# Patient Record
Sex: MALE | Race: BLACK OR AFRICAN AMERICAN | NOT HISPANIC OR LATINO | Employment: FULL TIME | URBAN - METROPOLITAN AREA
[De-identification: names, ages, dates, MRNs, and addresses within clinical notes are randomized per-mention and may not be internally consistent; named-entity substitution may affect disease eponyms.]

---

## 2020-11-19 ENCOUNTER — HOSPITAL ENCOUNTER (OUTPATIENT)
Dept: URGENT CARE | Facility: CLINIC | Age: 30
Discharge: HOME | End: 2020-11-19

## 2020-11-21 LAB — BACTERIA SPEC AEROBE CULT: NORMAL

## 2020-11-22 LAB — SARS-COV-2 RNA SPEC QL NAA+PROBE: NOT DETECTED

## 2021-08-09 PROCEDURE — 87081 CULTURE SCREEN ONLY: CPT | Performed by: NURSE PRACTITIONER

## 2021-08-09 PROCEDURE — U0003 INFECTIOUS AGENT DETECTION BY NUCLEIC ACID (DNA OR RNA); SEVERE ACUTE RESPIRATORY SYNDROME CORONAVIRUS 2 (SARS-COV-2) (CORONAVIRUS DISEASE [COVID-19]), AMPLIFIED PROBE TECHNIQUE, MAKING USE OF HIGH THROUGHPUT TECHNOLOGIES AS DESCRIBED BY CMS-2020-01-R: HCPCS | Performed by: NURSE PRACTITIONER

## 2021-08-10 ENCOUNTER — TELEPHONE (OUTPATIENT)
Dept: URGENT CARE | Facility: CLINIC | Age: 31
End: 2021-08-10

## 2021-08-10 NOTE — TELEPHONE ENCOUNTER
----- Message from RICHARD Resendiz sent at 8/9/2021  8:52 PM EDT -----  Please notify patient of negative Covid result.

## 2021-08-11 ENCOUNTER — TELEPHONE (OUTPATIENT)
Dept: OTHER | Facility: OTHER | Age: 31
End: 2021-08-11

## 2021-08-12 ENCOUNTER — TELEPHONE (OUTPATIENT)
Dept: OTHER | Facility: OTHER | Age: 31
End: 2021-08-12

## 2021-08-12 NOTE — TELEPHONE ENCOUNTER
----- Message from RICHRAD Tejada sent at 8/11/2021 10:08 AM EDT -----  Please call the patient regarding his negative result.

## 2021-08-13 NOTE — TELEPHONE ENCOUNTER
----- Message from RICHARD Tejada sent at 8/11/2021 10:08 AM EDT -----  Please call the patient regarding his negative result.

## 2021-08-30 PROCEDURE — U0003 INFECTIOUS AGENT DETECTION BY NUCLEIC ACID (DNA OR RNA); SEVERE ACUTE RESPIRATORY SYNDROME CORONAVIRUS 2 (SARS-COV-2) (CORONAVIRUS DISEASE [COVID-19]), AMPLIFIED PROBE TECHNIQUE, MAKING USE OF HIGH THROUGHPUT TECHNOLOGIES AS DESCRIBED BY CMS-2020-01-R: HCPCS | Performed by: NURSE PRACTITIONER

## 2022-12-16 PROCEDURE — 87899 AGENT NOS ASSAY W/OPTIC: CPT | Performed by: FAMILY MEDICINE

## 2022-12-16 PROCEDURE — 80074 ACUTE HEPATITIS PANEL: CPT | Performed by: FAMILY MEDICINE

## 2022-12-16 PROCEDURE — 86665 EPSTEIN-BARR CAPSID VCA: CPT | Performed by: FAMILY MEDICINE

## 2022-12-16 PROCEDURE — 86701 HIV-1ANTIBODY: CPT | Performed by: FAMILY MEDICINE

## 2022-12-16 PROCEDURE — 87110 CHLAMYDIA CULTURE: CPT | Performed by: FAMILY MEDICINE

## 2022-12-16 PROCEDURE — 86592 SYPHILIS TEST NON-TREP QUAL: CPT | Performed by: FAMILY MEDICINE

## 2022-12-16 PROCEDURE — 86702 HIV-2 ANTIBODY: CPT | Performed by: FAMILY MEDICINE

## 2022-12-16 PROCEDURE — 87140 CULTURE TYPE IMMUNOFLUORESC: CPT | Performed by: FAMILY MEDICINE

## 2022-12-16 PROCEDURE — 87081 CULTURE SCREEN ONLY: CPT | Performed by: FAMILY MEDICINE

## 2022-12-16 PROCEDURE — G0432 EIA HIV-1/HIV-2 SCREEN: HCPCS | Performed by: FAMILY MEDICINE

## 2022-12-16 PROCEDURE — 86663 EPSTEIN-BARR ANTIBODY: CPT | Performed by: FAMILY MEDICINE

## 2022-12-17 ENCOUNTER — TELEPHONE (OUTPATIENT)
Dept: URGENT CARE | Facility: CLINIC | Age: 32
End: 2022-12-17

## 2022-12-17 NOTE — TELEPHONE ENCOUNTER
Contacted patient and reviewed lab results with patient.  Discussed positive HIV screen with follow up differentiation pending.  Discussed potential cross reactivity with EBV early antigen testing and HIV virus.  Unable to determine if true current mono infection.  Recommend further evaluation and management for HIV at Infectious disease clinic.  Called and left message with [facility] clinic.  Reviewed past medical records consistent with prior early HIV diagnosed in 2019.  Patient still frustrated with symptoms of dry mouth and white film on corners of mouth developing repeatedly.  Counseled that there is possibility this is related to underlying medical condition not acute illness and also cannot rule out association with HIV.

## 2023-01-05 ENCOUNTER — APPOINTMENT (OUTPATIENT)
Dept: GENERAL RADIOLOGY | Facility: HOSPITAL | Age: 33
End: 2023-01-05
Payer: MEDICAID

## 2023-01-05 ENCOUNTER — HOSPITAL ENCOUNTER (EMERGENCY)
Facility: HOSPITAL | Age: 33
Discharge: HOME | End: 2023-01-05
Attending: EMERGENCY MEDICINE | Admitting: EMERGENCY MEDICINE
Payer: MEDICAID

## 2023-01-05 VITALS
HEIGHT: 69 IN | OXYGEN SATURATION: 99 % | HEART RATE: 99 BPM | DIASTOLIC BLOOD PRESSURE: 70 MMHG | BODY MASS INDEX: 23.12 KG/M2 | WEIGHT: 156.09 LBS | TEMPERATURE: 98.5 F | RESPIRATION RATE: 18 BRPM | SYSTOLIC BLOOD PRESSURE: 125 MMHG

## 2023-01-05 DIAGNOSIS — R07.9 CHEST PAIN, UNSPECIFIED TYPE: Primary | ICD-10-CM

## 2023-01-05 DIAGNOSIS — M25.511 ACUTE PAIN OF RIGHT SHOULDER: ICD-10-CM

## 2023-01-05 DIAGNOSIS — R07.89 CHEST PAIN, MUSCULOSKELETAL: ICD-10-CM

## 2023-01-05 DIAGNOSIS — R07.89 CHEST PAIN, NON-CARDIAC: ICD-10-CM

## 2023-01-05 LAB
ALBUMIN SERPL-MCNC: 3.6 G/DL (ref 3.5–5.2)
ALBUMIN/GLOB SERPL: 0.6 G/DL
ALP SERPL-CCNC: 115 U/L (ref 39–117)
ALT SERPL W P-5'-P-CCNC: 21 U/L (ref 1–41)
ANION GAP SERPL CALCULATED.3IONS-SCNC: 9.9 MMOL/L (ref 5–15)
AST SERPL-CCNC: 30 U/L (ref 1–40)
BASOPHILS # BLD AUTO: 0 10*3/MM3 (ref 0–0.2)
BASOPHILS NFR BLD AUTO: 0 % (ref 0–1.5)
BILIRUB SERPL-MCNC: 0.2 MG/DL (ref 0–1.2)
BUN SERPL-MCNC: 9 MG/DL (ref 6–20)
BUN/CREAT SERPL: 11.1 (ref 7–25)
CALCIUM SPEC-SCNC: 9.2 MG/DL (ref 8.6–10.5)
CHLORIDE SERPL-SCNC: 100 MMOL/L (ref 98–107)
CO2 SERPL-SCNC: 24.1 MMOL/L (ref 22–29)
CREAT SERPL-MCNC: 0.81 MG/DL (ref 0.76–1.27)
DEPRECATED RDW RBC AUTO: 38.9 FL (ref 37–54)
EGFRCR SERPLBLD CKD-EPI 2021: 119.4 ML/MIN/1.73
EOSINOPHIL # BLD AUTO: 0.05 10*3/MM3 (ref 0–0.4)
EOSINOPHIL NFR BLD AUTO: 0.9 % (ref 0.3–6.2)
ERYTHROCYTE [DISTWIDTH] IN BLOOD BY AUTOMATED COUNT: 13 % (ref 12.3–15.4)
GLOBULIN UR ELPH-MCNC: 5.7 GM/DL
GLUCOSE SERPL-MCNC: 93 MG/DL (ref 65–99)
HCT VFR BLD AUTO: 33.8 % (ref 37.5–51)
HGB BLD-MCNC: 11.4 G/DL (ref 13–17.7)
HOLD SPECIMEN: NORMAL
HOLD SPECIMEN: NORMAL
IMM GRANULOCYTES # BLD AUTO: 0.02 10*3/MM3 (ref 0–0.05)
IMM GRANULOCYTES NFR BLD AUTO: 0.3 % (ref 0–0.5)
LYMPHOCYTES # BLD AUTO: 1.6 10*3/MM3 (ref 0.7–3.1)
LYMPHOCYTES NFR BLD AUTO: 27.7 % (ref 19.6–45.3)
MCH RBC QN AUTO: 27.9 PG (ref 26.6–33)
MCHC RBC AUTO-ENTMCNC: 33.7 G/DL (ref 31.5–35.7)
MCV RBC AUTO: 82.6 FL (ref 79–97)
MONOCYTES # BLD AUTO: 0.34 10*3/MM3 (ref 0.1–0.9)
MONOCYTES NFR BLD AUTO: 5.9 % (ref 5–12)
NEUTROPHILS NFR BLD AUTO: 3.77 10*3/MM3 (ref 1.7–7)
NEUTROPHILS NFR BLD AUTO: 65.2 % (ref 42.7–76)
NRBC BLD AUTO-RTO: 0 /100 WBC (ref 0–0.2)
NT-PROBNP SERPL-MCNC: <36 PG/ML (ref 0–450)
PLATELET # BLD AUTO: 182 10*3/MM3 (ref 140–450)
PMV BLD AUTO: 11.4 FL (ref 6–12)
POTASSIUM SERPL-SCNC: 4.1 MMOL/L (ref 3.5–5.2)
PROT SERPL-MCNC: 9.3 G/DL (ref 6–8.5)
QT INTERVAL: 332 MS
RBC # BLD AUTO: 4.09 10*6/MM3 (ref 4.14–5.8)
SODIUM SERPL-SCNC: 134 MMOL/L (ref 136–145)
TROPONIN T SERPL-MCNC: <0.01 NG/ML (ref 0–0.03)
WBC NRBC COR # BLD: 5.78 10*3/MM3 (ref 3.4–10.8)
WHOLE BLOOD HOLD COAG: NORMAL
WHOLE BLOOD HOLD SPECIMEN: NORMAL

## 2023-01-05 PROCEDURE — 85025 COMPLETE CBC W/AUTO DIFF WBC: CPT | Performed by: EMERGENCY MEDICINE

## 2023-01-05 PROCEDURE — 36415 COLL VENOUS BLD VENIPUNCTURE: CPT

## 2023-01-05 PROCEDURE — 99283 EMERGENCY DEPT VISIT LOW MDM: CPT

## 2023-01-05 PROCEDURE — 97161 PT EVAL LOW COMPLEX 20 MIN: CPT | Performed by: PHYSICAL THERAPIST

## 2023-01-05 PROCEDURE — 80053 COMPREHEN METABOLIC PANEL: CPT | Performed by: EMERGENCY MEDICINE

## 2023-01-05 PROCEDURE — 83880 ASSAY OF NATRIURETIC PEPTIDE: CPT | Performed by: EMERGENCY MEDICINE

## 2023-01-05 PROCEDURE — 93005 ELECTROCARDIOGRAM TRACING: CPT | Performed by: EMERGENCY MEDICINE

## 2023-01-05 PROCEDURE — 71045 X-RAY EXAM CHEST 1 VIEW: CPT

## 2023-01-05 PROCEDURE — 93005 ELECTROCARDIOGRAM TRACING: CPT

## 2023-01-05 PROCEDURE — 93010 ELECTROCARDIOGRAM REPORT: CPT | Performed by: INTERNAL MEDICINE

## 2023-01-05 PROCEDURE — 84484 ASSAY OF TROPONIN QUANT: CPT | Performed by: EMERGENCY MEDICINE

## 2023-01-05 PROCEDURE — 97110 THERAPEUTIC EXERCISES: CPT | Performed by: PHYSICAL THERAPIST

## 2023-01-05 RX ORDER — NAPROXEN 250 MG/1
500 TABLET ORAL ONCE
Status: COMPLETED | OUTPATIENT
Start: 2023-01-05 | End: 2023-01-05

## 2023-01-05 RX ORDER — IBUPROFEN 400 MG/1
800 TABLET ORAL ONCE
Status: DISCONTINUED | OUTPATIENT
Start: 2023-01-05 | End: 2023-01-05

## 2023-01-05 RX ORDER — SODIUM CHLORIDE 0.9 % (FLUSH) 0.9 %
10 SYRINGE (ML) INJECTION AS NEEDED
Status: DISCONTINUED | OUTPATIENT
Start: 2023-01-05 | End: 2023-01-05 | Stop reason: HOSPADM

## 2023-01-05 RX ORDER — IBUPROFEN 800 MG/1
800 TABLET ORAL EVERY 6 HOURS PRN
Qty: 20 TABLET | Refills: 0 | Status: SHIPPED | OUTPATIENT
Start: 2023-01-05 | End: 2023-01-05 | Stop reason: ALTCHOICE

## 2023-01-05 RX ADMIN — NAPROXEN 500 MG: 250 TABLET ORAL at 17:08

## 2023-01-05 NOTE — THERAPY EVALUATION
Patient Name: Tuyet Rogers  : 1990    MRN: [id number]                              Today's Date: 2023       Admit Date: 2023    Visit Dx:     ICD-10-CM ICD-9-CM   1. Chest pain, unspecified type  R07.9 786.50   2. Acute pain of right shoulder  M25.511 719.41     There is no problem list on file for this patient.    No past medical history on file.  No past surgical history on file.   General Information     Row Name 23 1552          Physical Therapy Time and Intention    Document Type evaluation  -LR     Mode of Treatment individual therapy  -LR     Row Name 23 1552          General Information    Patient Profile Reviewed yes  -LR     Prior Level of Function independent:  -LR           User Key  (r) = Recorded By, (t) = Taken By, (c) = Cosigned By    Initials Name Provider Type    LR Paige Fletcher, PT Physical Therapist              Subjective: Pt reports on  he had to lift his mom off of the floor due to a fall.  He states she was sick and he ended up lifting her four times total.  He states since then he has had pain in the R side of his back around his shoulder blade.  He also reports SOA and fatigue.  He states he also had a sinus infection prior to this.  He reports headaches daily that last all day.  He reports pressure in his chest. He reports a heaviness on the R side of his chest and that it becomes sharp.  Pt reports some nausea.  He denies fever.  He reports pain/cracking under his R shoulder blade when he moves his arms.     Objective:  Posture: rounded shoulders    Palpation: Tender to palpation at R inferior scapula, inferior to R scapula, medial border of R scapula, R lats, R chest (ribs 2-3 at sternum attachment)    ROM:  R shoulder ROM WNL; cracking/popping under lateral side of R scapula with elevation  Mild hypomobility through thoracic spine with PA mobilizations     Strength:  L Shoulder MMT:   R Shoulder MMT:  Flexion: 5   Flexion: 4  Abduction:  5   Abduction: 4  External Rotation: 5  External Rotation: 5  Internal Rotation: 5  Internal Rotation: 5    Pec strength on R 5/5    Sensation: B UE sensation intact    Assessment/Plan:   Pt presents with a diagnosis of R chest and scapula pain and has R shoulder weakness, pain with shoulder ROM, and tenderness over R chest and scapular border that are limiting his ability to reach overhead and lift.  The patient was educated in thoracic and pec mobility exercises for home and provided with a HEP handout.     Goals:   LTG 1: The patient will be independent in HEP in order to decrease pain and improve tolerance to functional activities.  STATUS: Met    Interventions:   Manual Therapy: not performed    Therapeutic Exercises: HEP: cat/camel, quadruped thoracic rotation, quadruped thread the needle, pec stretch in corner, thoracic extension over chair, tennis ball rolling of scapular region    Modalities: not performed   Outcome Measures     Row Name 01/05/23 2946          Optimal Instrument    Optimal Instrument Optimal - 3  -LR     Lying Flat 2  -LR     Moving - lying to sitting 2  -LR     Sitting 2  -LR     From the list, choose the 3 activities you would most like to be able to do without any difficulty Moving -lying to sitting;Sitting;Lying flat  -LR     Total Score Optimal - 3 4  -LR     Row Name 01/05/23 8754          Functional Assessment    Outcome Measure Options Optimal Instrument  -LR           User Key  (r) = Recorded By, (t) = Taken By, (c) = Cosigned By    Initials Name Provider Type    LR Paige Fletcher, PT Physical Therapist               01/05/23 1117   PT Evaluation Complexity   History, PT Evaluation Complexity no personal factors and/or comorbidities   Examination of Body Systems (PT Eval Complexity) 1-2 elements   Clinical Presentation (PT Evaluation Complexity) stable   Clinical Decision Making (PT Evaluation Complexity) low complexity   Overall Complexity (PT Evaluation Complexity) low complexity       Time Calculation:    PT Charges     Row Name 01/05/23 1553             Time Calculation    PT Received On 01/05/23  -LR         Time Calculation- PT    Total Timed Code Minutes- PT 34 minute(s)  -LR         Timed Charges    21174 - PT Therapeutic Exercise Minutes 13  -LR         Untimed Charges    PT Eval/Re-eval Minutes 21  -LR         Total Minutes    Timed Charges Total Minutes 13  -LR      Untimed Charges Total Minutes 21  -LR       Total Minutes 34  -LR            User Key  (r) = Recorded By, (t) = Taken By, (c) = Cosigned By    Initials Name Provider Type    LR Paige Fletcher, PT Physical Therapist              Therapy Charges for Today     Code Description Service Date Service Provider Modifiers Qty    47448330492 HC PT THER PROC EA 15 MIN 1/5/2023 Paige Fletcher, PT GP 1    71523344882 HC PT EVAL LOW COMPLEXITY 2 1/5/2023 Paige Fletcher, PT GP 1          PT G-Codes  Outcome Measure Options: Optimal Instrument       Paige Fletcher, PT  1/5/2023

## 2023-01-05 NOTE — DISCHARGE INSTRUCTIONS
Please know that your cardiac, heart, work-up was all within normal limits.  It is likely that the pain you are having is muscle related.  Additionally your chest x-ray was normal and shows no type of respiratory infection.  Please take the medications prescribed today as directed.  Also please do the stretches and exercises at the physical therapist has demonstrated to you while in the emergency department today.  If it anytime you develop chest pain with difficulty breathing at the same time, any sweating or diaphoretic episodes, any jaw pain, or severe nausea, vomiting, diarrhea please return to the emergency department.  Otherwise follow-up with your primary care provider in 3 to 5 days

## 2023-01-05 NOTE — ED PROVIDER NOTES
Time: 3:49 PM EST  Date of encounter:  1/5/2023  Independent Historian/Clinical History and Information was obtained by:   Patient, medical records  Chief Complaint: SOB and cough    History is limited by: N/A    History of Present Illness:  Patient is a 32 y.o. year old male who presents to the emergency department for evaluation of SOB and cough.    Patient presents with right lung pain that started approximately 2x weeks ago. Patient also confirms symptoms of cough, SOB, nausea, fatigue, back pain. Patient notes his cough is productive, with brown sputum. Patient also notes back pain is right sided and related to lifting heavy items. Patient rates his pain level 6 out of 10 at rest and worsens with movement. Patient states napping improves his pain. Patient states he had COVID-19 about 1x year ago.          Patient Care Team  Primary Care Provider: Provider, No Known    Past Medical History:     No Known Allergies  No past medical history on file.  No past surgical history on file.  Family History   Problem Relation Age of Onset   • Hypertension Maternal Grandmother    • Diabetes Maternal Grandmother    • Hypertension Maternal Grandfather    • Diabetes Maternal Grandfather    • Hypertension Paternal Grandmother    • Diabetes Paternal Grandmother    • Hypertension Paternal Grandfather    • Diabetes Paternal Grandfather        Home Medications:  Prior to Admission medications    Medication Sig Start Date End Date Taking? Authorizing Provider   azithromycin (Zithromax Z-Ramon) 250 MG tablet Take as directed 12/16/22   Varinder Cowan MD   omeprazole (priLOSEC) 20 MG capsule Take 20 mg by mouth Daily.    Emergency, Nurse Brissa, RN        Social History:   Social History     Tobacco Use   • Smoking status: Never     Passive exposure: Never   • Smokeless tobacco: Never   Vaping Use   • Vaping Use: Never used   Substance Use Topics   • Alcohol use: Yes     Comment: occasional   • Drug use: Never         Review of  Systems:  Review of Systems   Constitutional: Positive for fatigue.   HENT: Negative.    Eyes: Negative.    Respiratory: Positive for cough and shortness of breath.    Cardiovascular: Positive for chest pain.   Gastrointestinal: Positive for nausea.   Endocrine: Negative.    Genitourinary: Negative.    Musculoskeletal: Positive for back pain.   Skin: Negative.    Allergic/Immunologic: Negative.    Neurological: Negative.    Hematological: Negative.    Psychiatric/Behavioral: Negative.         Physical Exam:  /70   Pulse 99   Temp 98.5 °F (36.9 °C) (Oral)   Resp 18   Ht 175.3 cm (69\")   Wt 70.8 kg (156 lb 1.4 oz)   SpO2 99%   BMI 23.05 kg/m²     Physical Exam  Vitals and nursing note reviewed.   Constitutional:       General: He is not in acute distress.     Appearance: Normal appearance. He is not ill-appearing or toxic-appearing.   HENT:      Head: Normocephalic and atraumatic.      Right Ear: External ear normal.      Left Ear: External ear normal.      Nose: Nose normal.      Mouth/Throat:      Lips: Pink.      Mouth: Mucous membranes are moist.   Eyes:      General: Lids are normal. No scleral icterus.     Extraocular Movements: Extraocular movements intact.      Conjunctiva/sclera: Conjunctivae normal.      Pupils: Pupils are equal, round, and reactive to light.   Cardiovascular:      Rate and Rhythm: Normal rate and regular rhythm.      Pulses: Normal pulses.           Radial pulses are 2+ on the right side and 2+ on the left side.      Heart sounds: Normal heart sounds. No murmur heard.    No friction rub.   Pulmonary:      Effort: Pulmonary effort is normal. No respiratory distress.      Breath sounds: Normal breath sounds. No stridor. No decreased breath sounds, wheezing, rhonchi or rales.   Abdominal:      General: Abdomen is flat. Bowel sounds are normal. There is no distension.      Palpations: Abdomen is soft.      Tenderness: There is no abdominal tenderness. There is no guarding or  rebound.   Musculoskeletal:         General: Normal range of motion.      Cervical back: Normal range of motion and neck supple.      Right lower leg: No edema.      Left lower leg: No edema.   Skin:     General: Skin is warm and dry.      Findings: No rash.   Neurological:      Mental Status: He is alert and oriented to person, place, and time. Mental status is at baseline.      Sensory: Sensation is intact.      Motor: Motor function is intact.   Psychiatric:         Behavior: Behavior normal. Behavior is cooperative.                  Procedures:  Procedures      Medical Decision Making:      Comorbidities that affect care:    None    External Notes reviewed:    Previous Clinic Note      The following orders were placed and all results were independently analyzed by me:  Orders Placed This Encounter   Procedures   • XR Chest 1 View   • [place] Draw   • Comprehensive Metabolic Panel   • BNP   • Troponin   • CBC Auto Differential   • PT Plan of Care Cert / Re-Cert   • ECG 12 Lead ED Triage Standing Order; SOA   • CBC & Differential   • Green Top (Gel)   • Lavender Top   • Gold Top - SST   • Light Blue Top       Medications Given in the Emergency Department:  Medications   naproxen (NAPROSYN) tablet 500 mg (500 mg Oral Given 1/5/23 1708)        ED Course:    ED Course as of 01/06/23 1205   Thu Jan 05, 2023   1500 Physical therapist has seen, evaluated, and worked with pt. She also feels as if the pt's pain is musculoskeletal  [MS]      ED Course User Index  [MS] Dana Hernández APRN       Labs:    Lab Results (last 24 hours)     Procedure Component Value Units Date/Time    CBC & Differential [[id number]]  (Abnormal) Collected: 01/05/23 1245    Specimen: Blood Updated: 01/05/23 1301    Narrative:      The following orders were created for panel order CBC & Differential.  Procedure                               Abnormality         Status                     ---------                               -----------          ------                     CBC Auto Differential[[id number]]        Abnormal            Final result                 Please view results for these tests on the individual orders.    Comprehensive Metabolic Panel [[id number]]  (Abnormal) Collected: 01/05/23 1245    Specimen: Blood Updated: 01/05/23 1326     Glucose 93 mg/dL      BUN 9 mg/dL      Creatinine 0.81 mg/dL      Sodium 134 mmol/L      Potassium 4.1 mmol/L      Chloride 100 mmol/L      CO2 24.1 mmol/L      Calcium 9.2 mg/dL      Total Protein 9.3 g/dL      Albumin 3.6 g/dL      ALT (SGPT) 21 U/L      AST (SGOT) 30 U/L      Alkaline Phosphatase 115 U/L      Total Bilirubin 0.2 mg/dL      Globulin 5.7 gm/dL      A/G Ratio 0.6 g/dL      BUN/Creatinine Ratio 11.1     Anion Gap 9.9 mmol/L      eGFR 119.4 mL/min/1.73      Comment: National Kidney Foundation and American Society of Nephrology (ASN) Task Force recommended calculation based on the Chronic Kidney Disease Epidemiology Collaboration (CKD-EPI) equation refit without adjustment for race.       Narrative:      GFR Normal >60  Chronic Kidney Disease <60  Kidney Failure <15      BNP [[id number]]  (Normal) Collected: 01/05/23 1245    Specimen: Blood Updated: 01/05/23 1318     proBNP <36.0 pg/mL     Narrative:      Among patients with dyspnea, NT-proBNP is highly sensitive for the detection of acute congestive heart failure. In addition NT-proBNP of <300 pg/ml effectively rules out acute congestive heart failure with 99% negative predictive value.      Troponin [[id number]]  (Normal) Collected: 01/05/23 1245    Specimen: Blood Updated: 01/05/23 1326     Troponin T <0.010 ng/mL     Narrative:      Troponin T Reference Range:  <= 0.03 ng/mL-   Negative for AMI  >0.03 ng/mL-     Abnormal for myocardial necrosis.  Clinicians would have to utilize clinical acumen, EKG, Troponin and serial changes to determine if it is an Acute Myocardial Infarction or myocardial injury due to an underlying chronic condition.        Results may be falsely decreased if patient taking Biotin.      CBC Auto Differential [[id number]]  (Abnormal) Collected: 01/05/23 1245    Specimen: Blood Updated: 01/05/23 1301     WBC 5.78 10*3/mm3      RBC 4.09 10*6/mm3      Hemoglobin 11.4 g/dL      Hematocrit 33.8 %      MCV 82.6 fL      MCH 27.9 pg      MCHC 33.7 g/dL      RDW 13.0 %      RDW-SD 38.9 fl      MPV 11.4 fL      Platelets 182 10*3/mm3      Neutrophil % 65.2 %      Lymphocyte % 27.7 %      Monocyte % 5.9 %      Eosinophil % 0.9 %      Basophil % 0.0 %      Immature Grans % 0.3 %      Neutrophils, Absolute 3.77 10*3/mm3      Lymphocytes, Absolute 1.60 10*3/mm3      Monocytes, Absolute 0.34 10*3/mm3      Eosinophils, Absolute 0.05 10*3/mm3      Basophils, Absolute 0.00 10*3/mm3      Immature Grans, Absolute 0.02 10*3/mm3      nRBC 0.0 /100 WBC            Imaging:    XR Chest 1 View    Result Date: 1/5/2023  PROCEDURE: XR CHEST 1 VW  COMPARISON: [facility], CR, CHEST PA/AP & LAT 2V, 7/22/2019, 14:00.  INDICATIONS: SOB  FINDINGS:  LUNGS: Normal.  No significant pulmonary parenchymal abnormalities.  VASCULATURE: Normal.  Unremarkable pulmonary vasculature.  CARDIAC: Normal.  No cardiac silhouette abnormality or cardiomegaly.  MEDIASTINUM: Normal.  No visible mass or adenopathy.  PLEURA: Normal.  No effusion or pleural thickening.  BONES: Normal.  No fracture or visible bony lesion.        No acute disease.  No significant change has occurred.   RAY WADDELL MD       Electronically Signed and Approved By: RAY WADDELL MD on 1/05/2023 at 14:59                 Differential Diagnosis and Discussion:    Cough: Differential diagnosis includes but is not limited to pneumonia, acute bronchitis, upper respiratory infection, ACE inhibitor use, allergic reaction, epiglottitis, seasonal allergies, chemical irritants, exercise-induced asthma, viral syndrome.    All X-rays were independently reviewed by me.  EKG was interpreted by  me.    MDM  Number of Diagnoses or Management Options  Chest pain, musculoskeletal: new and does not require workup  Chest pain, non-cardiac: new and does not require workup     Amount and/or Complexity of Data Reviewed  Clinical lab tests: reviewed and ordered  Tests in the radiology section of CPT®: ordered and reviewed  Review and summarize past medical records: yes (I have personally reviewed patient's previous medical encounters.  )    Risk of Complications, Morbidity, and/or Mortality  Presenting problems: low  Diagnostic procedures: low  Management options: low    Patient Progress  Patient progress: stable       Critical Care Note: Total Critical Care time of 0 minutes. Total critical care time documented does not include time spent on separately billed procedures for services of nurses or physician assistants. I personally saw and examined the patient. I have reviewed all diagnostic interpretations and treatment plans as written. I was present for the key portions of any procedures performed and the inclusive time noted in any critical care statement. Critical care time includes patient management by me, time spent at the patients bedside,  time to review lab and imaging results, discussing patient care, documentation in the medical record, and time spent with family or caregiver.    Patient Care Considerations:    None      Consultants/Shared Management Plan:    None    Social Determinants of Health:    Patient is independent, reliable, and has access to care.       Disposition and Care Coordination:    Discharged: The patient is suitable and stable for discharge with no need for consideration of observation or admission.        Final diagnoses:   Chest pain, non-cardiac   Chest pain, musculoskeletal        ED Disposition     ED Disposition   Discharge    Condition   Stable    Comment   --             This medical record created using voice recognition software.    Documentation assistance provided by  Ember Sharma acting as scribe for RICHARD Franco. Information recorded by the scribe was done at my direction and has been verified and validated by me.        Ember Sharma  01/05/23 8298       Dana Hernández APRN  01/06/23 1203

## 2023-01-13 ENCOUNTER — HOSPITAL ENCOUNTER (EMERGENCY)
Facility: HOSPITAL | Age: 33
Discharge: HOME | End: 2023-01-14
Attending: EMERGENCY MEDICINE | Admitting: EMERGENCY MEDICINE
Payer: MEDICAID

## 2023-01-13 ENCOUNTER — APPOINTMENT (OUTPATIENT)
Dept: GENERAL RADIOLOGY | Facility: HOSPITAL | Age: 33
End: 2023-01-13
Payer: MEDICAID

## 2023-01-13 DIAGNOSIS — M94.0 COSTOCHONDRITIS, ACUTE: Primary | ICD-10-CM

## 2023-01-13 DIAGNOSIS — R05.1 ACUTE COUGH: ICD-10-CM

## 2023-01-13 LAB
ALBUMIN SERPL-MCNC: 3.8 G/DL (ref 3.5–5.2)
ALBUMIN/GLOB SERPL: 0.7 G/DL
ALP SERPL-CCNC: 139 U/L (ref 39–117)
ALT SERPL W P-5'-P-CCNC: 36 U/L (ref 1–41)
ANION GAP SERPL CALCULATED.3IONS-SCNC: 11.9 MMOL/L (ref 5–15)
AST SERPL-CCNC: 46 U/L (ref 1–40)
BASOPHILS # BLD AUTO: 0.01 10*3/MM3 (ref 0–0.2)
BASOPHILS NFR BLD AUTO: 0.2 % (ref 0–1.5)
BILIRUB SERPL-MCNC: 0.2 MG/DL (ref 0–1.2)
BUN SERPL-MCNC: 7 MG/DL (ref 6–20)
BUN/CREAT SERPL: 9.5 (ref 7–25)
CALCIUM SPEC-SCNC: 9.3 MG/DL (ref 8.6–10.5)
CHLORIDE SERPL-SCNC: 96 MMOL/L (ref 98–107)
CO2 SERPL-SCNC: 25.1 MMOL/L (ref 22–29)
CREAT SERPL-MCNC: 0.74 MG/DL (ref 0.76–1.27)
DEPRECATED RDW RBC AUTO: 38.2 FL (ref 37–54)
EGFRCR SERPLBLD CKD-EPI 2021: 122.7 ML/MIN/1.73
EOSINOPHIL # BLD AUTO: 0.03 10*3/MM3 (ref 0–0.4)
EOSINOPHIL NFR BLD AUTO: 0.6 % (ref 0.3–6.2)
ERYTHROCYTE [DISTWIDTH] IN BLOOD BY AUTOMATED COUNT: 12.8 % (ref 12.3–15.4)
GLOBULIN UR ELPH-MCNC: 5.6 GM/DL
GLUCOSE SERPL-MCNC: 87 MG/DL (ref 65–99)
HCT VFR BLD AUTO: 35.1 % (ref 37.5–51)
HGB BLD-MCNC: 11.8 G/DL (ref 13–17.7)
HOLD SPECIMEN: NORMAL
IMM GRANULOCYTES # BLD AUTO: 0.02 10*3/MM3 (ref 0–0.05)
IMM GRANULOCYTES NFR BLD AUTO: 0.4 % (ref 0–0.5)
LYMPHOCYTES # BLD AUTO: 1.48 10*3/MM3 (ref 0.7–3.1)
LYMPHOCYTES NFR BLD AUTO: 29 % (ref 19.6–45.3)
MCH RBC QN AUTO: 27.6 PG (ref 26.6–33)
MCHC RBC AUTO-ENTMCNC: 33.6 G/DL (ref 31.5–35.7)
MCV RBC AUTO: 82.2 FL (ref 79–97)
MONOCYTES # BLD AUTO: 0.41 10*3/MM3 (ref 0.1–0.9)
MONOCYTES NFR BLD AUTO: 8 % (ref 5–12)
NEUTROPHILS NFR BLD AUTO: 3.15 10*3/MM3 (ref 1.7–7)
NEUTROPHILS NFR BLD AUTO: 61.8 % (ref 42.7–76)
NRBC BLD AUTO-RTO: 0 /100 WBC (ref 0–0.2)
NT-PROBNP SERPL-MCNC: <36 PG/ML (ref 0–450)
PLATELET # BLD AUTO: 152 10*3/MM3 (ref 140–450)
PMV BLD AUTO: 12 FL (ref 6–12)
POTASSIUM SERPL-SCNC: 3.7 MMOL/L (ref 3.5–5.2)
PROT SERPL-MCNC: 9.4 G/DL (ref 6–8.5)
RBC # BLD AUTO: 4.27 10*6/MM3 (ref 4.14–5.8)
SODIUM SERPL-SCNC: 133 MMOL/L (ref 136–145)
TROPONIN T SERPL-MCNC: <0.01 NG/ML (ref 0–0.03)
WBC NRBC COR # BLD: 5.1 10*3/MM3 (ref 3.4–10.8)
WHOLE BLOOD HOLD COAG: NORMAL

## 2023-01-13 PROCEDURE — 93010 ELECTROCARDIOGRAM REPORT: CPT | Performed by: INTERNAL MEDICINE

## 2023-01-13 PROCEDURE — 25010000002 KETOROLAC TROMETHAMINE PER 15 MG: Performed by: EMERGENCY MEDICINE

## 2023-01-13 PROCEDURE — 99283 EMERGENCY DEPT VISIT LOW MDM: CPT

## 2023-01-13 PROCEDURE — 63710000001 ONDANSETRON ODT 4 MG TABLET DISPERSIBLE: Performed by: EMERGENCY MEDICINE

## 2023-01-13 PROCEDURE — 80053 COMPREHEN METABOLIC PANEL: CPT | Performed by: EMERGENCY MEDICINE

## 2023-01-13 PROCEDURE — 83880 ASSAY OF NATRIURETIC PEPTIDE: CPT | Performed by: EMERGENCY MEDICINE

## 2023-01-13 PROCEDURE — 93005 ELECTROCARDIOGRAM TRACING: CPT | Performed by: EMERGENCY MEDICINE

## 2023-01-13 PROCEDURE — 71045 X-RAY EXAM CHEST 1 VIEW: CPT

## 2023-01-13 PROCEDURE — 84484 ASSAY OF TROPONIN QUANT: CPT

## 2023-01-13 PROCEDURE — 85025 COMPLETE CBC W/AUTO DIFF WBC: CPT | Performed by: EMERGENCY MEDICINE

## 2023-01-13 PROCEDURE — 96374 THER/PROPH/DIAG INJ IV PUSH: CPT

## 2023-01-13 PROCEDURE — 36415 COLL VENOUS BLD VENIPUNCTURE: CPT | Performed by: EMERGENCY MEDICINE

## 2023-01-13 RX ORDER — KETOROLAC TROMETHAMINE 30 MG/ML
30 INJECTION, SOLUTION INTRAMUSCULAR; INTRAVENOUS ONCE
Status: DISCONTINUED | OUTPATIENT
Start: 2023-01-13 | End: 2023-01-13

## 2023-01-13 RX ORDER — ONDANSETRON 4 MG/1
4 TABLET, ORALLY DISINTEGRATING ORAL ONCE
Status: COMPLETED | OUTPATIENT
Start: 2023-01-13 | End: 2023-01-13

## 2023-01-13 RX ORDER — KETOROLAC TROMETHAMINE 30 MG/ML
30 INJECTION, SOLUTION INTRAMUSCULAR; INTRAVENOUS ONCE
Status: COMPLETED | OUTPATIENT
Start: 2023-01-13 | End: 2023-01-13

## 2023-01-13 RX ADMIN — KETOROLAC TROMETHAMINE 30 MG: 30 INJECTION, SOLUTION INTRAMUSCULAR; INTRAVENOUS at 21:45

## 2023-01-13 RX ADMIN — ONDANSETRON 4 MG: 4 TABLET, ORALLY DISINTEGRATING ORAL at 19:05

## 2023-01-13 NOTE — ED PROVIDER NOTES
Time: 5:28 PM EST  Date of encounter:  1/13/2023  Independent Historian/Clinical History and Information was obtained by:   Patient  Chief Complaint   Patient presents with   • Chest Pain   • Shortness of Breath       History is limited by: N/A    History of Present Illness:  Patient is a 32 y.o. year old male who presents to the emergency department for evaluation of chest pain that patient describes as pressure ongoing, was seen previously for work-up on January 5, negative work-up.  Has not taken any analgesics in 2 days.  Admits to nausea denies fever, chills and vomiting.(Brain Zaragoza, DANA)    Pt has been having intermittent middle to right-sternal border chest pain. He notes the pain can be sharp and can feel like pressure. He says the pain will take his breath away.     He admits SOB. He admits movement and exertion can make his pain worse. He says he is having trouble swallowing. He admits nausea. He denies chills, coughing, and emesis.     Pt was in the ED on 1/5/23 for SOB, back pain, and coughing. Pt told the triage nurse that he was recently diagnosed with HIV. He does not want his mother in the room to know.     Pt says his pain has improved since given medication.           Patient Care Team  Primary Care Provider: Provider, No Known    Past Medical History:     No Known Allergies  Past Medical History:   Diagnosis Date   • HIV (human immunodeficiency virus infection) (HCC)      History reviewed. No pertinent surgical history.  Family History   Problem Relation Age of Onset   • Hypertension Maternal Grandmother    • Diabetes Maternal Grandmother    • Hypertension Maternal Grandfather    • Diabetes Maternal Grandfather    • Hypertension Paternal Grandmother    • Diabetes Paternal Grandmother    • Hypertension Paternal Grandfather    • Diabetes Paternal Grandfather        Home Medications:  Prior to Admission medications    Medication Sig Start Date End Date Taking? Authorizing Provider  "  azithromycin (Zithromax Z-Ramon) 250 MG tablet Take as directed 12/16/22   Varinder Cowan MD   diclofenac (VOLTAREN) 50 MG EC tablet Take 1 tablet by mouth 3 (Three) Times a Day. 1/5/23   Dana Hernández APRN   omeprazole (priLOSEC) 20 MG capsule Take 20 mg by mouth Daily.    Emergency, Nurse Brissa, RN        Social History:   Social History     Tobacco Use   • Smoking status: Never     Passive exposure: Never   • Smokeless tobacco: Never   Vaping Use   • Vaping Use: Never used   Substance Use Topics   • Alcohol use: Yes     Comment: occasional   • Drug use: Never         Review of Systems:  Review of Systems   Constitutional: Negative for chills and fever.   HENT: Positive for trouble swallowing. Negative for congestion, ear pain and sore throat.    Eyes: Negative for pain.   Respiratory: Positive for shortness of breath. Negative for cough and chest tightness.    Cardiovascular: Positive for chest pain.   Gastrointestinal: Positive for nausea. Negative for abdominal pain, diarrhea and vomiting.   Genitourinary: Negative for flank pain and hematuria.   Musculoskeletal: Negative for joint swelling.   Skin: Negative for pallor.   Neurological: Negative for seizures and headaches.   All other systems reviewed and are negative.       Physical Exam:  /75   Pulse 84   Temp 98.4 °F (36.9 °C) (Oral)   Resp 14   Ht 175.3 cm (69\")   Wt 71.7 kg (158 lb 1.1 oz)   SpO2 99%   BMI 23.34 kg/m²     Physical Exam  Vitals and nursing note reviewed.   Constitutional:       General: He is not in acute distress.     Appearance: Normal appearance. He is not toxic-appearing.   HENT:      Head: Normocephalic and atraumatic.      Mouth/Throat:      Mouth: Mucous membranes are moist.   Eyes:      General: No scleral icterus.  Cardiovascular:      Rate and Rhythm: Normal rate and regular rhythm.      Pulses: Normal pulses.      Heart sounds: Normal heart sounds.   Pulmonary:      Effort: Pulmonary effort is normal. " No respiratory distress.      Breath sounds: Normal breath sounds.   Chest:      Chest wall: Tenderness present.      Comments: Reproducible tenderness on palpation of Right sternal border.   Abdominal:      General: Abdomen is flat.      Palpations: Abdomen is soft.      Tenderness: There is no abdominal tenderness.   Musculoskeletal:         General: Normal range of motion.      Cervical back: Normal range of motion and neck supple.   Skin:     General: Skin is warm and dry.   Neurological:      Mental Status: He is alert and oriented to person, place, and time. Mental status is at baseline.                  Procedures:  Procedures      Medical Decision Making:      Comorbidities that affect care:    HIV    External Notes reviewed:    Previous Radiological Studies, Previous EKG and Previous Labs      The following orders were placed and all results were independently analyzed by me:  Orders Placed This Encounter   Procedures   • XR Chest 1 View   • Comprehensive Metabolic Panel   • CBC Auto Differential   • BNP   • Troponin   • ECG 12 Lead Chest Pain   • CBC & Differential   • Extra Tubes   • Gold Top - SST   • [place] Blood Culture Bottle Set   • Gray Top   • Light Blue Top       Medications Given in the Emergency Department:  Medications   ondansetron ODT (ZOFRAN-ODT) disintegrating tablet 4 mg (4 mg Oral Given 1/13/23 1905)   ketorolac (TORADOL) injection 30 mg (30 mg Intravenous Given 1/13/23 2145)        ED Course:    The patient was initially evaluated in the triage area where orders were placed. The patient was later dispositioned by Molina Solo DO.      The patient was advised to stay for completion of workup which includes but is not limited to communication of labs and radiological results, reassessment and plan. The patient was advised that leaving prior to disposition by a provider could result in critical findings that are not communicated to the patient.     ED Course as of 01/14/23 1951 Fri Jan 13,  2023 1730 --- PROVIDER IN TRIAGE NOTE ---    The patient was evaluated my me, Brain Zaragoza in triage. Orders were placed and the patient is currently awaiting disposition.    [AJ]   Sat Jan 14, 2023 1949 EKG performed at 1738 was interpreted by me to show a normal sinus rhythm with a ventricular rate of 97 bpm.  The HI interval was 194 ms.  P waves are normal.  QRS interval is normal.  Axis and 63 degrees.  There is no acute ischemic ST or T wave change identified.  QT corrected is 400 ms.  This EKG was compared to prior dated 1/5/2023 and shows no significant changes. [TB]      ED Course User Index  [AJ] Brain Zaragoza PA-C  [TB] Molina Solo DO       Labs:    Lab Results (last 24 hours)     ** No results found for the last 24 hours. **         The patient was seen and evaluated in the ED by me.  The above history and physical examination was performed as documented.  Diagnostic data was obtained.  Results reviewed.  Patient's work-up was unremarkable.  Patient's chest pain appears to be reproducible musculoskeletal pain.  Further discussion with his primary nurse provided information that he did not provide to me.  She reported that he recently been diagnosed with HIV and he is extremely worried about this.  His mother is unaware of this diagnosis.  Subsequently I will treat the patient's cough with Bactrim empirically despite a negative chest x-ray along with pain medications for his chest wall pain.    Imaging:    No Radiology Exams Resulted Within Past 24 Hours      Differential Diagnosis and Discussion:      Chest Pain:  Based on the patient's signs and symptoms, I considered aortic dissection, myocardial infaction, pulmonary embolism, cardiac tamponade, pericarditis, pneumothorax, musculoskeletal chest pain and other differential diagnosis as an etiology of the patient's chest pain.   Cough: Differential diagnosis includes but is not limited to pneumonia, acute bronchitis, upper respiratory  infection, ACE inhibitor use, allergic reaction, epiglottitis, seasonal allergies, chemical irritants, exercise-induced asthma, viral syndrome.    All labs were reviewed and analyzed by me.  All X-rays were independently reviewed by me.  EKG was interpreted by me.    MDM         Patient Care Considerations:          Consultants/Shared Management Plan:    None    Social Determinants of Health:    Patient is independent, reliable, and has access to care.       Disposition and Care Coordination:    Discharged: The patient is suitable and stable for discharge with no need for consideration of observation or admission.    I have explained the patient´s condition, diagnoses and treatment plan based on the information available to me at this time. I have answered questions and addressed any concerns. The patient has a good  understanding of the patient´s diagnosis, condition, and treatment plan as can be expected at this point. The vital signs have been stable. The patient´s condition is stable and appropriate for discharge from the emergency department.      The patient will pursue further outpatient evaluation with the primary care physician or other designated or consulting physician as outlined in the discharge instructions. They are agreeable to this plan of care and follow-up instructions have been explained in detail. The patient has received these instructions in written format and have expressed an understanding of the discharge instructions. The patient is aware that any significant change in condition or worsening of symptoms should prompt an immediate return to this or the closest emergency department or call to 911.  I have explained discharge medications and the need for follow up with the patient/caretakers. This was also printed in the discharge instructions. Patient was discharged with the following medications and follow up:      Medication List      New Prescriptions    sulfamethoxazole-trimethoprim  800-160 MG per tablet  Commonly known as: BACTRIM DS,SEPTRA DS  Take 1 tablet by mouth 2 (Two) Times a Day.     traMADol 50 MG tablet  Commonly known as: ULTRAM  Take 1 tablet by mouth Every 6 (Six) Hours As Needed for Moderate Pain.        Stop    azithromycin 250 MG tablet  Commonly known as: Zithromax Z-Ramon           Where to Get Your Medications      These medications were sent to [id number] - [place], [place] - [address] AT [facility] - [phone]  - [phone] FX  [address] [place] [address]    Phone: [phone]   · sulfamethoxazole-trimethoprim 800-160 MG per tablet  · traMADol 50 MG tablet        Follow-up with your primary care provider in 1 week if symptoms or not improved.           Final diagnoses:   Costochondritis, acute   Acute cough        ED Disposition     ED Disposition   Discharge    Condition   Stable    Comment   --             This medical record created using voice recognition software.    Documentation assistance provided by Hayden Reyna acting as scribe for Molina Solo DO. Information recorded by the scribe was done at my direction and has been verified and validated by me.          Hayden Reyna  01/13/23 7405       Molina Solo DO  01/14/23 7677

## 2023-01-14 VITALS
SYSTOLIC BLOOD PRESSURE: 108 MMHG | HEART RATE: 84 BPM | DIASTOLIC BLOOD PRESSURE: 75 MMHG | HEIGHT: 69 IN | BODY MASS INDEX: 23.41 KG/M2 | OXYGEN SATURATION: 99 % | RESPIRATION RATE: 14 BRPM | WEIGHT: 158.07 LBS | TEMPERATURE: 98.4 F

## 2023-01-14 RX ORDER — TRAMADOL HYDROCHLORIDE 50 MG/1
50 TABLET ORAL EVERY 6 HOURS PRN
Qty: 20 TABLET | Refills: 0 | Status: SHIPPED | OUTPATIENT
Start: 2023-01-14

## 2023-01-14 RX ORDER — SULFAMETHOXAZOLE AND TRIMETHOPRIM 800; 160 MG/1; MG/1
1 TABLET ORAL 2 TIMES DAILY
Qty: 20 TABLET | Refills: 0 | Status: SHIPPED | OUTPATIENT
Start: 2023-01-14

## 2023-01-14 NOTE — ED NOTES
Patient alert and oriented x4, answering questions appropriately.  Mom at bedside.  Patient states he has had chest pain and cough for 3 weeks.  He had to pick his mom up off the floor three times late December, the last time being December 23, 2022.  He does state chest pain worsens with deep breathing and with palpation.  Admits a productive cough x 1-2 weeks.

## 2023-01-14 NOTE — DISCHARGE INSTRUCTIONS
Take the antibiotics as prescribed.  Avoiding strenuous activities until better.  Take the pain medication sparingly as needed.  Follow your primary care provider in 1 week.  Return to the ER for any new or worsening symptoms.

## 2023-01-14 NOTE — ED NOTES
"Patient called out for RN.  This RN went in and patient stated he wanted to know why he hasn't been seen or why he hasn't had something yet for his chest pain or headache.  Made patient aware no provider has signed up for him yet, but that I could ask them for medications.  He then stated, \"well, i'm having chest pain and I could have went to [place] and been seen by now.  I didn't want to come here.\" Made patient aware that we are monitoring his labs and tests as they come back, and his vital signs on the monitor as well, and we do have critical patients that are being seen first and then a provider will be in to see them, but that I would ask for pain medications in the meantime.   "

## 2023-01-14 NOTE — ED NOTES
Patient stated in the room that he was anxious about new diagnoses that he has and worried that it is causing some of this symptoms for tonight.  Mom was in the room so patient whispered to me when she was not looking that he was recently diagnosed with HIV.  He became very tearful and said he was worried.

## 2023-01-15 LAB — QT INTERVAL: 314 MS

## 2023-02-23 ENCOUNTER — TRANSCRIBE ORDERS (OUTPATIENT)
Dept: ADMINISTRATIVE | Facility: HOSPITAL | Age: 33
End: 2023-02-23
Payer: MEDICAID

## 2023-02-23 ENCOUNTER — HOSPITAL ENCOUNTER (OUTPATIENT)
Dept: GENERAL RADIOLOGY | Facility: HOSPITAL | Age: 33
Discharge: HOME | End: 2023-02-23
Admitting: INTERNAL MEDICINE
Payer: MEDICAID

## 2023-02-23 DIAGNOSIS — R05.1 ACUTE COUGH: Primary | ICD-10-CM

## 2023-02-23 DIAGNOSIS — R05.1 ACUTE COUGH: ICD-10-CM

## 2023-02-23 PROCEDURE — 71046 X-RAY EXAM CHEST 2 VIEWS: CPT

## 2024-05-29 ENCOUNTER — HOSPITAL ENCOUNTER (EMERGENCY)
Facility: HOSPITAL | Age: 34
Discharge: HOME | End: 2024-05-30
Attending: EMERGENCY MEDICINE
Payer: COMMERCIAL

## 2024-05-29 VITALS
OXYGEN SATURATION: 99 % | DIASTOLIC BLOOD PRESSURE: 97 MMHG | HEART RATE: 93 BPM | SYSTOLIC BLOOD PRESSURE: 139 MMHG | HEIGHT: 71 IN | TEMPERATURE: 99 F | BODY MASS INDEX: 25.59 KG/M2 | WEIGHT: 182.76 LBS | RESPIRATION RATE: 18 BRPM

## 2024-05-29 DIAGNOSIS — A51.49 SECONDARY SYPHILIS: Primary | ICD-10-CM

## 2024-05-29 PROCEDURE — 25010000002 PENICILLIN G BENZATHINE PER 2400000 UNITS: Performed by: EMERGENCY MEDICINE

## 2024-05-29 PROCEDURE — 96372 THER/PROPH/DIAG INJ SC/IM: CPT

## 2024-05-29 PROCEDURE — 99282 EMERGENCY DEPT VISIT SF MDM: CPT

## 2024-05-29 RX ADMIN — PENICILLIN G BENZATHINE 2.4 MILLION UNITS: 2400000 INJECTION, SUSPENSION INTRAMUSCULAR at 22:54

## 2024-05-30 NOTE — ED NOTES
Pt verbalizes he needs penicillin G for his treatment as allergic to the other treatment for syphilis

## 2024-05-30 NOTE — ED PROVIDER NOTES
Time: 10:53 PM EDT  Date of encounter:  5/29/2024  Independent Historian/Clinical History and Information was obtained by:   Patient    History is limited by: N/A    Chief Complaint: Syphilis treatment      History of Present Illness:  Patient is a 33 y.o. year old male who presents to the emergency department for evaluation of needing syphilis treatment.  Patient reports he is known he had syphilis for approximately 1 week but has not yet been treated.  He states he went to urgent care and they did not have penicillin and he is allergic to doxycycline so they sent him to the emergency department.    HPI    Patient Care Team  Primary Care Provider: Praveen Montalvo MD    Past Medical History:     Allergies   Allergen Reactions    Doxycycline Unknown - Low Severity and Unknown (See Comments)     Past Medical History:   Diagnosis Date    HIV (human immunodeficiency virus infection)      History reviewed. No pertinent surgical history.  Family History   Problem Relation Age of Onset    Hypertension Maternal Grandmother     Diabetes Maternal Grandmother     Hypertension Maternal Grandfather     Diabetes Maternal Grandfather     Hypertension Paternal Grandmother     Diabetes Paternal Grandmother     Hypertension Paternal Grandfather     Diabetes Paternal Grandfather        Home Medications:  Prior to Admission medications    Medication Sig Start Date End Date Taking? Authorizing Provider   Biktarvy -25 MG per tablet Take 1 tablet by mouth Daily. 4/24/24   ProviderJose MD   cyclobenzaprine (FLEXERIL) 10 MG tablet Take 1 tablet by mouth 3 (Three) Times a Day As Needed for Muscle Spasms.    Provider, MD Jose   omeprazole (priLOSEC) 20 MG capsule Take 20 mg by mouth Daily.    Emergency, Nurse Brissa, RN        Social History:   Social History     Tobacco Use    Smoking status: Never     Passive exposure: Never    Smokeless tobacco: Never   Vaping Use    Vaping status: Never Used   Substance Use  "Topics    Alcohol use: Yes     Comment: occasional    Drug use: Never         Review of Systems:  Review of Systems   Constitutional:  Negative for chills and fever.   HENT:  Negative for congestion, rhinorrhea and sore throat.    Eyes:  Negative for pain and visual disturbance.   Respiratory:  Negative for apnea, cough, chest tightness and shortness of breath.    Cardiovascular:  Negative for chest pain and palpitations.   Gastrointestinal:  Negative for abdominal pain, diarrhea, nausea and vomiting.   Genitourinary:  Negative for difficulty urinating and dysuria.   Musculoskeletal:  Negative for joint swelling and myalgias.   Skin:  Negative for color change.   Neurological:  Negative for seizures and headaches.   Psychiatric/Behavioral: Negative.     All other systems reviewed and are negative.       Physical Exam:  /97 (BP Location: Right arm, Patient Position: Sitting)   Pulse 93   Temp 99 °F (37.2 °C) (Oral)   Resp 18   Ht 180.3 cm (71\")   Wt 82.9 kg (182 lb 12.2 oz)   SpO2 99%   BMI 25.49 kg/m²     Physical Exam  Vitals and nursing note reviewed.   Constitutional:       General: He is not in acute distress.     Appearance: Normal appearance. He is not toxic-appearing.   HENT:      Head: Normocephalic and atraumatic.      Jaw: There is normal jaw occlusion.   Eyes:      General: Lids are normal.      Extraocular Movements: Extraocular movements intact.      Conjunctiva/sclera: Conjunctivae normal.      Pupils: Pupils are equal, round, and reactive to light.   Cardiovascular:      Rate and Rhythm: Normal rate and regular rhythm.      Pulses: Normal pulses.      Heart sounds: Normal heart sounds.   Pulmonary:      Effort: Pulmonary effort is normal. No respiratory distress.      Breath sounds: Normal breath sounds. No wheezing or rhonchi.   Abdominal:      General: Abdomen is flat.      Palpations: Abdomen is soft.      Tenderness: There is no abdominal tenderness. There is no guarding or rebound. "   Musculoskeletal:         General: Normal range of motion.      Cervical back: Normal range of motion and neck supple.      Right lower leg: No edema.      Left lower leg: No edema.   Skin:     General: Skin is warm and dry.      Comments: Lesions to the soles of both feet, some on the arms and legs as well.   Neurological:      Mental Status: He is alert and oriented to person, place, and time. Mental status is at baseline.   Psychiatric:         Mood and Affect: Mood normal.                  Procedures:  Procedures      Medical Decision Making:      Comorbidities that affect care:    HIV    External Notes reviewed:    None      The following orders were placed and all results were independently analyzed by me:  No orders of the defined types were placed in this encounter.      Medications Given in the Emergency Department:  Medications   Penicillin G Benzathine (BICILLIN-LA) injection 2.4 Million Units (2.4 Million Units Intramuscular Given 5/29/24 6521)        ED Course:         Labs:    Lab Results (last 24 hours)       ** No results found for the last 24 hours. **             Imaging:    No Radiology Exams Resulted Within Past 24 Hours      Differential Diagnosis and Discussion:    Rash: Differential diagnosis includes but is not limited to sepsis, cellulitis, [place] Spotted Fever, meningitis, meningococcemia, Varicella, Strep infection, dermatitis, allergic reaction, Lyme disease, and toxic shock syndrome.    All labs were reviewed and interpreted by me.    MDM  Number of Diagnoses or Management Options  Secondary syphilis  Diagnosis management comments: Patient presents complaining of needing syphilis treatment.  He is allergic to doxycycline.  He has received penicillin injection in the emergency department.  Very strict return to ER and follow-up instructions have been provided to the patient.                   Patient Care Considerations:    ANTIBIOTICS: I considered prescribing antibiotics as an  outpatient however no bacterial focus of infection was found.      Consultants/Shared Management Plan:    None    Social Determinants of Health:    Patient is independent, reliable, and has access to care.       Disposition and Care Coordination:    Discharged: The patient is suitable and stable for discharge with no need for consideration of admission.    I have explained the patient´s condition, diagnoses and treatment plan based on the information available to me at this time. I have answered questions and addressed any concerns. The patient has a good  understanding of the patient´s diagnosis, condition, and treatment plan as can be expected at this point. The vital signs have been stable. The patient´s condition is stable and appropriate for discharge from the emergency department.      The patient will pursue further outpatient evaluation with the primary care physician or other designated or consulting physician as outlined in the discharge instructions. They are agreeable to this plan of care and follow-up instructions have been explained in detail. The patient has received these instructions in written format and has expressed an understanding of the discharge instructions. The patient is aware that any significant change in condition or worsening of symptoms should prompt an immediate return to this or the closest emergency department or call to 911.  I have explained discharge medications and the need for follow up with the patient/caretakers. This was also printed in the discharge instructions. Patient was discharged with the following medications and follow up:      Medication List      No changes were made to your prescriptions during this visit.      Praveen Montalvo MD  [address] [place] [address]  [phone]    In 1 week         Final diagnoses:   Secondary syphilis        ED Disposition       ED Disposition   Discharge    Condition   --    Comment   --               This medical  record created using voice recognition software.             Logan Guajardo MD  05/30/24 7558

## 2024-11-22 ENCOUNTER — HOSPITAL ENCOUNTER (EMERGENCY)
Facility: HOSPITAL | Age: 34
Discharge: HOME | End: 2024-11-22
Attending: EMERGENCY MEDICINE
Payer: MEDICAID

## 2024-11-22 VITALS
BODY MASS INDEX: 25.62 KG/M2 | OXYGEN SATURATION: 96 % | SYSTOLIC BLOOD PRESSURE: 174 MMHG | HEART RATE: 98 BPM | TEMPERATURE: 98.1 F | RESPIRATION RATE: 16 BRPM | HEIGHT: 71 IN | WEIGHT: 182.98 LBS | DIASTOLIC BLOOD PRESSURE: 94 MMHG

## 2024-11-22 DIAGNOSIS — T78.40XA ALLERGIC REACTION, INITIAL ENCOUNTER: Primary | ICD-10-CM

## 2024-11-22 PROCEDURE — 99283 EMERGENCY DEPT VISIT LOW MDM: CPT

## 2024-11-22 PROCEDURE — 63710000001 PREDNISONE PER 5 MG

## 2024-11-22 RX ORDER — PREDNISONE 10 MG/1
20 TABLET ORAL ONCE
Status: COMPLETED | OUTPATIENT
Start: 2024-11-22 | End: 2024-11-22

## 2024-11-22 RX ORDER — FAMOTIDINE 20 MG/1
20 TABLET, FILM COATED ORAL DAILY
Qty: 5 TABLET | Refills: 0 | Status: SHIPPED | OUTPATIENT
Start: 2024-11-22 | End: 2024-11-27

## 2024-11-22 RX ORDER — METHYLPREDNISOLONE 4 MG/1
TABLET ORAL
Qty: 21 TABLET | Refills: 0 | Status: SHIPPED | OUTPATIENT
Start: 2024-11-22 | End: 2024-11-27

## 2024-11-22 RX ORDER — SULFAMETHOXAZOLE AND TRIMETHOPRIM 800; 160 MG/1; MG/1
1 TABLET ORAL DAILY
COMMUNITY
Start: 2024-07-02

## 2024-11-22 RX ORDER — FAMOTIDINE 20 MG/1
20 TABLET, FILM COATED ORAL
Status: DISCONTINUED | OUTPATIENT
Start: 2024-11-22 | End: 2024-11-22

## 2024-11-22 RX ORDER — FAMOTIDINE 20 MG/1
20 TABLET, FILM COATED ORAL ONCE
Status: COMPLETED | OUTPATIENT
Start: 2024-11-22 | End: 2024-11-22

## 2024-11-22 RX ORDER — VELPATASVIR AND SOFOSBUVIR 100; 400 MG/1; MG/1
1 TABLET, FILM COATED ORAL DAILY
COMMUNITY
Start: 2024-10-08 | End: 2024-12-31

## 2024-11-22 RX ADMIN — FAMOTIDINE 20 MG: 20 TABLET ORAL at 15:50

## 2024-11-22 RX ADMIN — PREDNISONE 20 MG: 10 TABLET ORAL at 15:50

## 2024-11-22 NOTE — DISCHARGE INSTRUCTIONS
You can continue taking Benadryl at home.  Please  Medrol Dosepak and famotidine.    If any shortness of breath or difficulty swallowing please return to the ED

## 2024-11-22 NOTE — ED PROVIDER NOTES
Time: 3:40 PM EST  Date of encounter:  11/22/2024  Independent Historian/Clinical History and Information was obtained by:   Patient    History is limited by: N/A    Chief Complaint   Patient presents with    Itching         History of Present Illness:  Patient is a 34 y.o. year old male who presents to the emergency department for evaluation of itching rash.  Patient states that couple hours PTA he started itching on his anterior bilateral elbows, posterior bilateral knees and hip area.  He states his perioral area also became itchy and had a scaly rash.  Patient states while he was at work he was having a sneezing fit and watery eyes.  Is a history of allergies.  He denies changes of food, topicals, laundry detergent, soaps etc.  He denies shortness of breath or difficulty swallowing.    Patient Care Team  Primary Care Provider: Praveen Montalvo MD    Past Medical History:     Allergies   Allergen Reactions    Doxycycline Unknown - Low Severity and Unknown (See Comments)     Past Medical History:   Diagnosis Date    HIV (human immunodeficiency virus infection)      History reviewed. No pertinent surgical history.  Family History   Problem Relation Age of Onset    Hypertension Maternal Grandmother     Diabetes Maternal Grandmother     Hypertension Maternal Grandfather     Diabetes Maternal Grandfather     Hypertension Paternal Grandmother     Diabetes Paternal Grandmother     Hypertension Paternal Grandfather     Diabetes Paternal Grandfather        Home Medications:  Prior to Admission medications    Medication Sig Start Date End Date Taking? Authorizing Provider   Biktarvy -25 MG per tablet Take 1 tablet by mouth Daily. 4/24/24  Yes ProviderJose MD   Sofosbuvir-Velpatasvir 400-100 MG tablet Take 1 tablet by mouth Daily. 10/8/24 12/31/24 Yes ProviderJose MD   sulfamethoxazole-trimethoprim (BACTRIM DS,SEPTRA DS) 800-160 MG per tablet Take 1 tablet by mouth Daily. 7/2/24  Yes Provider  "Historical, MD   cyclobenzaprine (FLEXERIL) 10 MG tablet Take 1 tablet by mouth 3 (Three) Times a Day As Needed for Muscle Spasms.    Provider, MD Jose   omeprazole (priLOSEC) 20 MG capsule Take 20 mg by mouth Daily.    Emergency, Nurse Epic, RN        Social History:   Social History     Tobacco Use    Smoking status: Never     Passive exposure: Never    Smokeless tobacco: Never   Vaping Use    Vaping status: Never Used   Substance Use Topics    Alcohol use: Yes     Comment: occasional    Drug use: Never         Review of Systems:  Review of Systems   Constitutional: Negative.    HENT:  Positive for sneezing. Negative for sore throat and trouble swallowing.    Eyes:  Positive for discharge.   Respiratory: Negative.     Cardiovascular: Negative.    Gastrointestinal: Negative.    Endocrine: Negative.    Genitourinary: Negative.    Musculoskeletal: Negative.    Skin:  Positive for rash.   Allergic/Immunologic: Negative.    Neurological: Negative.    Hematological: Negative.    Psychiatric/Behavioral: Negative.          Physical Exam:  /94 (BP Location: Right arm, Patient Position: Sitting)   Pulse 98   Temp 98.1 °F (36.7 °C) (Oral)   Resp 16   Ht 180.3 cm (71\")   Wt 83 kg (182 lb 15.7 oz)   SpO2 96%   BMI 25.52 kg/m²         Physical Exam  Vitals and nursing note reviewed.   Constitutional:       Appearance: Normal appearance.   HENT:      Head: Normocephalic and atraumatic.      Ears:      Comments: Bilateral external ear is mildly erythematous but swollen     Nose: Nose normal.      Mouth/Throat:      Lips: No lesions.      Mouth: Mucous membranes are moist.   Eyes:      General: No scleral icterus.        Right eye: No discharge.         Left eye: No discharge.      Extraocular Movements: Extraocular movements intact.      Conjunctiva/sclera: Conjunctivae normal.      Pupils: Pupils are equal, round, and reactive to light.   Cardiovascular:      Rate and Rhythm: Normal rate and regular rhythm. "      Heart sounds: Normal heart sounds.   Pulmonary:      Effort: Pulmonary effort is normal.      Breath sounds: Normal breath sounds.   Musculoskeletal:         General: Normal range of motion.      Cervical back: Normal range of motion and neck supple.   Skin:     General: Skin is warm and dry.      Findings: Rash (very mild) present.          Neurological:      General: No focal deficit present.      Mental Status: He is alert and oriented to person, place, and time.   Psychiatric:         Mood and Affect: Mood normal.         Behavior: Behavior normal.                    Procedures:  Procedures      Medical Decision Making:      Comorbidities that affect care:    None    External Notes reviewed:    Previous ED Note: [facility] ED visit May 29, 2024      The following orders were placed and all results were independently analyzed by me:  No orders of the defined types were placed in this encounter.      Medications Given in the Emergency Department:  Medications   predniSONE (DELTASONE) tablet 20 mg (has no administration in time range)   famotidine (PEPCID) tablet 20 mg (has no administration in time range)        ED Course:    The patient was initially evaluated in the triage area where orders were placed. The patient was later dispositioned by Brain Zaragoza PA-C.      The patient was advised to stay for completion of workup which includes but is not limited to communication of labs and radiological results, reassessment and plan. The patient was advised that leaving prior to disposition by a provider could result in critical findings that are not communicated to the patient.          Labs:    Lab Results (last 24 hours)       ** No results found for the last 24 hours. **             Imaging:    No Radiology Exams Resulted Within Past 24 Hours      Differential Diagnosis and Discussion:      Allergic Reaction: Differential diagnosis includes but is not limited to drug side effects, contact dermatitis,  autoimmune conditions, infections, mast cell disorders, serum sickness, anaphylactoid reactions, angioedema, panic or anxiety attacks.        MDM                   Patient Care Considerations:    CHEST X-RAY: I considered ordering a chest x-ray however CTA in all lobes      Consultants/Shared Management Plan:    None    Social Determinants of Health:    Patient is independent, reliable, and has access to care.       Disposition and Care Coordination:    Discharged: The patient is suitable and stable for discharge with no need for consideration of admission.    I have explained the patient´s condition, diagnoses and treatment plan based on the information available to me at this time. I have answered questions and addressed any concerns. The patient has a good  understanding of the patient´s diagnosis, condition, and treatment plan as can be expected at this point. The vital signs have been stable. The patient´s condition is stable and appropriate for discharge from the emergency department.      The patient will pursue further outpatient evaluation with the primary care physician or other designated or consulting physician as outlined in the discharge instructions. They are agreeable to this plan of care and follow-up instructions have been explained in detail. The patient has received these instructions in written format and has expressed an understanding of the discharge instructions. The patient is aware that any significant change in condition or worsening of symptoms should prompt an immediate return to this or the closest emergency department or call to 911.  I have explained discharge medications and the need for follow up with the patient/caretakers. This was also printed in the discharge instructions. Patient was discharged with the following medications and follow up:      Medication List        New Prescriptions      famotidine 20 MG tablet  Commonly known as: PEPCID  Take 1 tablet by mouth Daily for 5  days.     methylPREDNISolone 4 MG dose pack  Commonly known as: MEDROL  Take 6 tablets by mouth Daily for 1 day, THEN 5 tablets Daily for 1 day, THEN 4 tablets Daily for 1 day, THEN 3 tablets Daily for 1 day, THEN 2 tablets Daily for 1 day, THEN 1 tablet Daily for 1 day. Take as directed on package instructions.  Start taking on: November 22, 2024               Where to Get Your Medications        These medications were sent to [id number] - [place], [place] - [address] AT [facility] - [phone]  - [phone]   [address] [place] [address] [place] [address]      Phone: [phone]   famotidine 20 MG tablet  methylPREDNISolone 4 MG dose pack      No follow-up provider specified.     Final diagnoses:   Allergic reaction, initial encounter        ED Disposition       ED Disposition   Discharge    Condition   Stable    Comment   --               This medical record created using voice recognition software.             Brain Zaragoza PA-C  11/22/24 0082